# Patient Record
Sex: FEMALE | Race: WHITE | NOT HISPANIC OR LATINO | Employment: STUDENT | URBAN - METROPOLITAN AREA
[De-identification: names, ages, dates, MRNs, and addresses within clinical notes are randomized per-mention and may not be internally consistent; named-entity substitution may affect disease eponyms.]

---

## 2020-08-05 PROBLEM — Z20.822 SUSPECTED COVID-19 VIRUS INFECTION: Status: ACTIVE | Noted: 2020-08-05

## 2020-08-12 PROBLEM — Z20.822 SUSPECTED COVID-19 VIRUS INFECTION: Status: RESOLVED | Noted: 2020-08-05 | Resolved: 2020-08-12

## 2020-08-12 PROBLEM — U07.1 COVID-19 VIRUS INFECTION: Status: ACTIVE | Noted: 2020-08-12

## 2020-12-15 ENCOUNTER — TELEPHONE (OUTPATIENT)
Dept: BARIATRICS | Facility: CLINIC | Age: 20
End: 2020-12-15

## 2020-12-15 NOTE — TELEPHONE ENCOUNTER
----- Message from Abdirashid Herring sent at 12/15/2020  3:29 PM CST -----  Contact: mother- carlitos  Pts mother stated she missed a call from the office and is asking for  a call back     Contact info- [phone]

## 2020-12-15 NOTE — TELEPHONE ENCOUNTER
Attempted to reach pt. in regards to scheduling a consult appointment for medical weight loss. No answer.Lvm. Requested a called back.

## 2020-12-15 NOTE — TELEPHONE ENCOUNTER
Returned call.  Spoke with patient's mother.  Discussed referral noted from Dr. Han to medical wt loss.  appt scheduled.  Caregiver aware of date/time/location and inbody scale instructions.  Caregiver to call insurance to verify insurance benefits.  Number to Gabriel Jacobsen, , given to caregiver for further financial questions.

## 2020-12-15 NOTE — TELEPHONE ENCOUNTER
----- Message from Khadijah Cole sent at 12/15/2020  8:38 AM CST -----  Regarding: Weight Management Referral  Good Morning,    Erin Han MD is referring this patient to weight management program for overweight. The order is already placed in Epic for patient to be scheduled. Please let me know if there is anything else you need to get this patient scheduled.    Thank you,  Khadijah

## 2020-12-21 NOTE — TELEPHONE ENCOUNTER
Returned pt called. Spoke with pt mother. Was informed pt stated her menstrual, yesterday. Due to patient will weigh in on-inbody scale. Appointment tomorrow has been canceled, an rescheduled. Pt and mother understand date and time of new appointment day.

## 2020-12-22 ENCOUNTER — OFFICE VISIT (OUTPATIENT)
Dept: ENDOCRINOLOGY | Facility: CLINIC | Age: 20
End: 2020-12-22
Payer: COMMERCIAL

## 2020-12-22 VITALS
OXYGEN SATURATION: 98 % | SYSTOLIC BLOOD PRESSURE: 110 MMHG | WEIGHT: 171.31 LBS | DIASTOLIC BLOOD PRESSURE: 84 MMHG | HEART RATE: 77 BPM | HEIGHT: 66 IN | BODY MASS INDEX: 27.53 KG/M2 | TEMPERATURE: 98 F

## 2020-12-22 DIAGNOSIS — E03.9 HYPOTHYROIDISM, UNSPECIFIED TYPE: ICD-10-CM

## 2020-12-22 DIAGNOSIS — E66.3 OVERWEIGHT: ICD-10-CM

## 2020-12-22 DIAGNOSIS — R63.5 WEIGHT GAIN, ABNORMAL: ICD-10-CM

## 2020-12-22 DIAGNOSIS — R53.83 FATIGUE, UNSPECIFIED TYPE: Primary | ICD-10-CM

## 2020-12-22 PROCEDURE — 99203 OFFICE O/P NEW LOW 30 MIN: CPT | Mod: S$GLB,,, | Performed by: INTERNAL MEDICINE

## 2020-12-22 PROCEDURE — 3008F BODY MASS INDEX DOCD: CPT | Mod: S$GLB,,, | Performed by: INTERNAL MEDICINE

## 2020-12-22 PROCEDURE — 1126F PR PAIN SEVERITY QUANTIFIED, NO PAIN PRESENT: ICD-10-PCS | Mod: S$GLB,,, | Performed by: INTERNAL MEDICINE

## 2020-12-22 PROCEDURE — 99203 PR OFFICE/OUTPT VISIT, NEW, LEVL III, 30-44 MIN: ICD-10-PCS | Mod: S$GLB,,, | Performed by: INTERNAL MEDICINE

## 2020-12-22 PROCEDURE — 3008F PR BODY MASS INDEX (BMI) DOCUMENTED: ICD-10-PCS | Mod: S$GLB,,, | Performed by: INTERNAL MEDICINE

## 2020-12-22 PROCEDURE — 1126F AMNT PAIN NOTED NONE PRSNT: CPT | Mod: S$GLB,,, | Performed by: INTERNAL MEDICINE

## 2020-12-22 PROCEDURE — 99999 PR PBB SHADOW E&M-EST. PATIENT-LVL IV: ICD-10-PCS | Mod: PBBFAC,,, | Performed by: INTERNAL MEDICINE

## 2020-12-22 PROCEDURE — 99999 PR PBB SHADOW E&M-EST. PATIENT-LVL IV: CPT | Mod: PBBFAC,,, | Performed by: INTERNAL MEDICINE

## 2020-12-22 RX ORDER — PHENTERMINE HYDROCHLORIDE 37.5 MG/1
37.5 CAPSULE ORAL EVERY MORNING
COMMUNITY

## 2020-12-22 RX ORDER — DEXAMETHASONE 1 MG/1
1 TABLET ORAL ONCE
Qty: 1 TABLET | Refills: 0 | Status: SHIPPED | OUTPATIENT
Start: 2020-12-22 | End: 2020-12-22

## 2020-12-22 NOTE — ASSESSMENT & PLAN NOTE
Address thyroid as above. Fatigue is a difficult symptom since so many things can impact it. Weight gain, anemia, b12 deficiency, iron deficiency, stress, depression, medication changes, sleep apnea, etc.   - will check some labs   - if normal, encourage pt to keep f/u with PCP to investigate other causes

## 2020-12-22 NOTE — LETTER
December 22, 2020      Erin Han MD  [address] [facility] [place] [address]           [facility]  [address] [place] [address]  Phone: [phone]          Patient: Domitila Leonard   MR Number: [id number]   YOB: 2000   Date of Visit: 12/22/2020       Dear Dr. Erin Han:    Thank you for referring Domitila Leonard to me for evaluation. Attached you will find relevant portions of my assessment and plan of care.    If you have questions, please do not hesitate to call me. I look forward to following Domitila Leonard along with you.    Sincerely,    Olegario Hood MD    Enclosure  CC:  No Recipients    If you would like to receive this communication electronically, please contact [email or web address] or [phone] to request more information on [employer] access.    For providers and/or their staff who would like to refer a patient to [facility], please contact us through our one-stop-shop provider referral line, [facility] , at [phone].    If you feel you have received this communication in error or would no longer like to receive these types of communications, please e-mail [email or web address]

## 2020-12-22 NOTE — PROGRESS NOTES
Subjective:      Chief Complaint: Fatigue and Weight Gain    HPI: Domitila Leonard is a 20 y.o. female who is here for an initial evaluation for thyroid.    With regards to her hypothyroidism:   Started medication about a year ago.  Since march, pt notes weight gain, fatigue, mood changes. PCP got labs, said something was a little bit off and started medication. Thinks TSH was 3.8 or so, then started medication. Also started phentermine.    Currently on phentermine 37.5, started about a week ago.    Current medication:  synthroid brand  Current dose: 25 mcg    Pt takes thyroid medication properly, in the early morning on an empty stomach with water. Denies missed doses/running out.    Diet: 9662-1540 arturo/day, low carb as well  Exercise: few times a week    Current symptoms:    Hard to lose weight. Fluctuation 5-6 lbs.  Reports she was around 135-140s, then early 2019, gained weight up to 170s.  Fatigue  Diarrhea, sometimes  Hot flashes  +FH thyroid disease (aunt)    Pt denies   cold intolerance. Palpitations, constipation, SOB    Reviewed past medical, family, social history and updated as appropriate.    Review of Systems   Constitutional: Positive for appetite change (increased), fatigue and unexpected weight change.   HENT: Negative for trouble swallowing.    Eyes: Negative for visual disturbance.   Respiratory: Negative for shortness of breath.    Cardiovascular: Negative for palpitations.   Gastrointestinal: Positive for diarrhea (sometimes). Negative for constipation.   Endocrine:        Hot flashes   Genitourinary: Positive for menstrual problem (recently changed birth control from nexplanon -> nothing. was irregular).   Neurological: Positive for headaches.   Psychiatric/Behavioral: Positive for sleep disturbance.     Objective:     Vitals:    12/22/20 0917   BP: 110/84   Pulse: 77   Temp: 97.5 °F (36.4 °C)     BP Readings from Last 5 Encounters:   12/22/20 110/84   12/10/20 130/84   08/12/20 122/80    08/05/20 120/76   07/02/20 116/78     Physical Exam  Vitals signs reviewed.   Constitutional:       Appearance: She is well-developed.   HENT:      Head: Normocephalic and atraumatic.   Neck:      Musculoskeletal: Normal range of motion and neck supple.      Thyroid: No thyromegaly.   Cardiovascular:      Rate and Rhythm: Normal rate and regular rhythm.      Heart sounds: No murmur.   Pulmonary:      Effort: Pulmonary effort is normal.      Breath sounds: Normal breath sounds.   Abdominal:      General: There is no distension.      Palpations: Abdomen is soft. There is no mass.      Tenderness: There is no abdominal tenderness.   Musculoskeletal: Normal range of motion.         General: No tenderness.   Neurological:      Mental Status: She is alert and oriented to person, place, and time.   Psychiatric:         Judgment: Judgment normal.         Wt Readings from Last 5 Encounters:   12/22/20 0917 77.7 kg (171 lb 4.8 oz)   12/10/20 1152 79.4 kg (175 lb)   07/02/20 1530 78 kg (172 lb)     Lab Results   Component Value Date    TSH 1.510 07/02/2020      Assessment/Plan:     Hypothyroidism  Pt reports diagnosed with hypothyroidism   - on further discussion, pt thinks she had TSH of around 3.8, started levothyroxine 25. Then switched to brand   - discussed that normal TSH goes up to 4 or 4.5 depending on the lab. Generally don't start supplementation unless TSH over 10, or with antibodies   - sounds like pt didn't even have subclinical hypothyroidism   - did review that I would start treatment for TSH of 3.8 if somebody trying to get pregnant. But pt was on birth control, and currently on phentermine, so not trying for pregnancy at this time   - as well, 3.8 TSH won't cause the 20-40 lbs of weight gain the patient reports   - so, okay to stop. Recheck labs in a few months. But will also check antibodies.   - if TPO elevated (which is possible, has +FH), she may benefit from Selenium 200 mcg/day OTC    Fatigue  Address  thyroid as above. Fatigue is a difficult symptom since so many things can impact it. Weight gain, anemia, b12 deficiency, iron deficiency, stress, depression, medication changes, sleep apnea, etc.   - will check some labs   - if normal, encourage pt to keep f/u with PCP to investigate other causes      Weight gain, abnormal  Not related to thyroid with how her labs were looking   - screen for cushings, few other things   - otherwise, sounds like pt is already making reasonable changes on diet, exercise, etc   - on phentermine, can continue, if labs okay would add topiramate.    - monitor weight        Follow up if symptoms worsen or fail to improve. timing TBD pending results.        Olegario Hood MD  Endocrinology

## 2020-12-22 NOTE — ASSESSMENT & PLAN NOTE
Not related to thyroid with how her labs were looking   - screen for cushings, few other things   - otherwise, sounds like pt is already making reasonable changes on diet, exercise, etc   - on phentermine, can continue, if labs okay would add topiramate.    - monitor weight

## 2020-12-22 NOTE — ASSESSMENT & PLAN NOTE
Pt reports diagnosed with hypothyroidism   - on further discussion, pt thinks she had TSH of around 3.8, started levothyroxine 25. Then switched to brand   - discussed that normal TSH goes up to 4 or 4.5 depending on the lab. Generally don't start supplementation unless TSH over 10, or with antibodies   - sounds like pt didn't even have subclinical hypothyroidism   - did review that I would start treatment for TSH of 3.8 if somebody trying to get pregnant. But pt was on birth control, and currently on phentermine, so not trying for pregnancy at this time   - as well, 3.8 TSH won't cause the 20-40 lbs of weight gain the patient reports   - so, okay to stop. Recheck labs in a few months. But will also check antibodies.   - if TPO elevated (which is possible, has +FH), she may benefit from Selenium 200 mcg/day OTC

## 2020-12-28 ENCOUNTER — TELEPHONE (OUTPATIENT)
Dept: ENDOCRINOLOGY | Facility: CLINIC | Age: 20
End: 2020-12-28

## 2020-12-28 ENCOUNTER — LAB VISIT (OUTPATIENT)
Dept: LAB | Facility: HOSPITAL | Age: 20
End: 2020-12-28
Attending: INTERNAL MEDICINE
Payer: COMMERCIAL

## 2020-12-28 DIAGNOSIS — E03.9 HYPOTHYROIDISM, UNSPECIFIED TYPE: ICD-10-CM

## 2020-12-28 DIAGNOSIS — R63.5 WEIGHT GAIN, ABNORMAL: ICD-10-CM

## 2020-12-28 DIAGNOSIS — R53.83 FATIGUE, UNSPECIFIED TYPE: ICD-10-CM

## 2020-12-28 LAB
ALBUMIN SERPL BCP-MCNC: 4.9 G/DL (ref 3.5–5.2)
ALP SERPL-CCNC: 67 U/L (ref 55–135)
ALT SERPL W/O P-5'-P-CCNC: 27 U/L (ref 10–44)
ANION GAP SERPL CALC-SCNC: 12 MMOL/L (ref 8–16)
AST SERPL-CCNC: 24 U/L (ref 10–40)
BASOPHILS # BLD AUTO: 0.02 K/UL (ref 0–0.2)
BASOPHILS NFR BLD: 0.3 % (ref 0–1.9)
BILIRUB SERPL-MCNC: 1 MG/DL (ref 0.1–1)
BUN SERPL-MCNC: 9 MG/DL (ref 6–20)
CALCIUM SERPL-MCNC: 9.2 MG/DL (ref 8.7–10.5)
CHLORIDE SERPL-SCNC: 101 MMOL/L (ref 95–110)
CO2 SERPL-SCNC: 24 MMOL/L (ref 23–29)
CORTIS SERPL-MCNC: <1 UG/DL (ref 4.3–22.4)
CREAT SERPL-MCNC: 0.8 MG/DL (ref 0.5–1.4)
DIFFERENTIAL METHOD: ABNORMAL
EOSINOPHIL # BLD AUTO: 0 K/UL (ref 0–0.5)
EOSINOPHIL NFR BLD: 0.3 % (ref 0–8)
ERYTHROCYTE [DISTWIDTH] IN BLOOD BY AUTOMATED COUNT: 11.3 % (ref 11.5–14.5)
EST. GFR  (AFRICAN AMERICAN): >60 ML/MIN/1.73 M^2
EST. GFR  (NON AFRICAN AMERICAN): >60 ML/MIN/1.73 M^2
FERRITIN SERPL-MCNC: 136 NG/ML (ref 20–300)
GLUCOSE SERPL-MCNC: 99 MG/DL (ref 70–110)
HCT VFR BLD AUTO: 44.3 % (ref 37–48.5)
HGB BLD-MCNC: 14.9 G/DL (ref 12–16)
IMM GRANULOCYTES # BLD AUTO: 0.01 K/UL (ref 0–0.04)
IMM GRANULOCYTES NFR BLD AUTO: 0.1 % (ref 0–0.5)
IRON SERPL-MCNC: 86 UG/DL (ref 30–160)
LYMPHOCYTES # BLD AUTO: 1.4 K/UL (ref 1–4.8)
LYMPHOCYTES NFR BLD: 21.1 % (ref 18–48)
MCH RBC QN AUTO: 31.5 PG (ref 27–31)
MCHC RBC AUTO-ENTMCNC: 33.6 G/DL (ref 32–36)
MCV RBC AUTO: 94 FL (ref 82–98)
MONOCYTES # BLD AUTO: 0.4 K/UL (ref 0.3–1)
MONOCYTES NFR BLD: 5.6 % (ref 4–15)
NEUTROPHILS # BLD AUTO: 4.9 K/UL (ref 1.8–7.7)
NEUTROPHILS NFR BLD: 72.6 % (ref 38–73)
NRBC BLD-RTO: 0 /100 WBC
PLATELET # BLD AUTO: 243 K/UL (ref 150–350)
PMV BLD AUTO: 11.6 FL (ref 9.2–12.9)
POTASSIUM SERPL-SCNC: 4.1 MMOL/L (ref 3.5–5.1)
PROT SERPL-MCNC: 8.1 G/DL (ref 6–8.4)
RBC # BLD AUTO: 4.73 M/UL (ref 4–5.4)
SATURATED IRON: 23 % (ref 20–50)
SODIUM SERPL-SCNC: 137 MMOL/L (ref 136–145)
T4 FREE SERPL-MCNC: 1.33 NG/DL (ref 0.71–1.51)
TOTAL IRON BINDING CAPACITY: 366 UG/DL (ref 250–450)
TRANSFERRIN SERPL-MCNC: 247 MG/DL (ref 200–375)
TSH SERPL DL<=0.005 MIU/L-ACNC: 1.45 UIU/ML (ref 0.34–5.6)
VIT B12 SERPL-MCNC: 389 PG/ML (ref 210–950)
WBC # BLD AUTO: 6.81 K/UL (ref 3.9–12.7)

## 2020-12-28 PROCEDURE — 84439 ASSAY OF FREE THYROXINE: CPT

## 2020-12-28 PROCEDURE — 86376 MICROSOMAL ANTIBODY EACH: CPT

## 2020-12-28 PROCEDURE — 85025 COMPLETE CBC W/AUTO DIFF WBC: CPT

## 2020-12-28 PROCEDURE — 36415 COLL VENOUS BLD VENIPUNCTURE: CPT

## 2020-12-28 PROCEDURE — 80053 COMPREHEN METABOLIC PANEL: CPT

## 2020-12-28 PROCEDURE — 82607 VITAMIN B-12: CPT

## 2020-12-28 PROCEDURE — 83540 ASSAY OF IRON: CPT

## 2020-12-28 PROCEDURE — 82533 TOTAL CORTISOL: CPT

## 2020-12-28 PROCEDURE — 82728 ASSAY OF FERRITIN: CPT

## 2020-12-28 PROCEDURE — 84443 ASSAY THYROID STIM HORMONE: CPT

## 2020-12-29 LAB — THYROPEROXIDASE IGG SERPL-ACNC: <6 IU/ML
